# Patient Record
(demographics unavailable — no encounter records)

---

## 2025-01-29 NOTE — HISTORY OF PRESENT ILLNESS
[FreeTextEntry1] : Patient is 67 years old para 2-0-0-2 last menstrual period age 48 She presents with her  complaining of painless frederick hematuria Patient admits to being a current smoker of cigarettes Urine analysis reveals frederick blood.  Urine dipstick is indeterminant for leukocytes etc.

## 2025-01-29 NOTE — DISCUSSION/SUMMARY
[FreeTextEntry1] : Advised patient to follow-up with urologist for full urologic evaluation Advised patient to proceed to emergency department

## 2025-01-29 NOTE — PHYSICAL EXAM
[Chaperone Present] : A chaperone was present in the examining room during all aspects of the physical examination [FreeTextEntry2] : JOSE ARMANDO [Labia Majora] : normal [Labia Minora] : normal [Normal] : normal [Uterine Adnexae] : normal

## 2025-02-13 NOTE — REVIEW OF SYSTEMS
[Leg Claudication] : intermittent leg claudication [Dysuria] : dysuria [Joint Pain] : joint pain [Muscle Cramps] : muscle cramps [Negative] : Heme/Lymph [SOB] : no shortness of breath [Chest Discomfort] : no chest discomfort [Lower Ext Edema] : no extremity edema [Palpitations] : no palpitations

## 2025-02-13 NOTE — ASSESSMENT
[FreeTextEntry1] : Preprocedural cardiac risk assessment: Patient without ACS, acute HF or unstable arrhythmia. Able to perform >4 METS.  -Patient is intermediate risk for intermediate risk procedure.  -Patient can hold Xarelto for 3 days prior to procedure and Plavix 5 days prior.  -Pt will need to be on ASA 81mg PO during this period.  -Restart AC and Plavix post procedure.  -Pt unsure if she will go ahead with gastric procedure.   Palpitations/AF/SVT: Has had in the past. Still occurs about once a month. On BB. Pt with AF on monitor. S/P Ablation on 05/05/23. -Continue Eliquis 5mg PO BID. -Continue with Toprol 200mg PO daily. - Follow up with  -May need to repeat telemetry.   DM: HA1c 8.0%->6.2% (12/23)->7.3% (07/24) -Continue with metformin. -Endocrine follow up.   HTN: BP at goal per ACC/AHA 2018 guidelines -Continue with amlodipine-valsartan 10-160mg PO daily and HCTZ 12.5mg PO daily. -Check TTE.   HLD: , , HDL 38, ->, TG 97, HDL 43, LDL 53 (12/23) -Continue with atorvastatin 40mg PO daily and fenofibrate 48mg PO daily. -Discussed therapeutic lifestyle changes to promote improved lipid metabolism.  Varicose veins: Bothers her at times. -Negative for DVT, varicose veins.  Dysuria: -Referral to Urology (Dr Zabala).  PAD: Left SFA occlusion s/p  intervention -Continue with Plavix and Eliquis.  -Continue with atorvastatin.  - Follow up with vascular -Pain in toes likely due to neuropathy.  -Check US Arterial LE.   Follow up in 4 months. -Check labs.

## 2025-02-13 NOTE — HISTORY OF PRESENT ILLNESS
[FreeTextEntry1] : Ms. Parish is a 65yo F with PMHx of HTN, HLD, DM, palpitations who presents for follow up. Her PMD is Dr. Andre Graf. Patient has been complaining of palpitations. They happen about once a month. She feels lightheaded and dizzy, and near syncope, but denies LOC. She has some chest pain and SOB along with symptoms of palpitations. Has had stress testing in past which is normal.  -Patient had MCOT placed and episode last night. MCOT shows AF/Flutter vs SVT with HR 170s. Patient was symptomatic at the time. She felt palpitations, lightheadedness and dizziness for about 2 hours. She took Xanax which helped somewhat.  01/30/23-Patient still with intermittent palpitations. She feels better overall. No CP, SOB, lightheadedness/dizziness.  05/18/23-Pt underwent AF ablation on 05/05/23. No complications. She is in SB today. Patient quit smoking.  09/14/23-Patient is going for right TKR on 09/22/23 with Dr. Levy. She had forgotten her Eliquis in [facility]. She restarted after. She has left leg pain which she went to ED and ruled out for DVT.  01/11/24-Patient had US LE which showed significant B/L disease. CTA showed possible B/L SFA disease. Cath showing left SFA occlusion s/p intervention with Dr. Campos. Patient reports an improvement in her symptoms: less pain in left leg, left foot is not cold anymore. She complains of pain and redness in her left knee. Patient complains of occasional palpitations.  05/01/24-Patient is going for lipoma removal with Dr. Andre Ramsey 05/15. She is feeling well overall.  08/08/24-Patient will still get some fluttering in her chest. She also will get intermittent swelling in her ankles and feet. She also will get neuropathy.  02/13/25-Patient evaluated in ED for stroke-like symptoms due to shaking. She was diagnosed with UTI. She is still having some abdominal pain. She quit smoking. She may go for gastric bypass surgery. She stopped taking Jardiance due to UTIs.

## 2025-02-13 NOTE — PHYSICAL EXAM
[Well Developed] : well developed [Well Nourished] : well nourished [No Acute Distress] : no acute distress [Normal Conjunctiva] : normal conjunctiva [Normal Venous Pressure] : normal venous pressure [5th Left ICS - MCL] : palpated at the 5th LICS in the midclavicular line [Normal] : normal [No Precordial Heave] : no precordial heave was noted [Normal Rate] : normal [Rhythm Regular] : regular [Normal S1] : normal S1 [Normal S2] : normal S2 [No Murmur] : no murmurs heard [No Pitting Edema] : no pitting edema present [Rt] : varicose veins of the right leg noted [Lt] : varicose veins of the left leg noted [2+] : right 2+ [1+] : left 1+ [Clear Lung Fields] : clear lung fields [Good Air Entry] : good air entry [No Respiratory Distress] : no respiratory distress  [Soft] : abdomen soft [Non Tender] : non-tender [Normal Bowel Sounds] : normal bowel sounds [Normal Gait] : normal gait [No Edema] : no edema [No Varicosities] : no varicosities [No Rash] : no rash [Moves all extremities] : moves all extremities [No Focal Deficits] : no focal deficits [Alert and Oriented] : alert and oriented [de-identified] : Increased erythema to B/L feet. WWP.

## 2025-02-13 NOTE — REASON FOR VISIT
[Other: ____] : [unfilled] [FreeTextEntry1] : Diagnostic Tests: --------------------- EK25-Sinus bradycardia at 54 bpm. RBBB. LAFB. PRWP.  24-Sinus bradycardia at 53 bpm. RBBB. LAFB. PRWP.  24-NSR. RBBB. LAFB. PRWP.  24-NSR. RBBB. LAFB. PRWP 23-NSR with 1st degree AVB. RBBB. LAFB. PRWP 23-NSR. RBBB. LAFB. PRWP.  22-NSR. RBBB. RAD. PRWP.  22-NSR> RBBB. LAFB. PRWP.  10/30/20-NSR. RBBB. Lateral infarct. Inferior infarct.  19-NSR. RBBB. LAFB. Inferior infarct.  --------------------- Echo:  23-TTE: EF 63%. Aortic sclerosis. Mild MV calcification. Mild PI.  19-TTE: EF >55%. Normal echo.  -------------------- Stress: 22-Lexiscan MPI: Negative for ischemia. EF 55%.  19-Dobutamine TTE: Submaximal due to target HR not reached. No evidence of ischemia.  ------------------ US:  23-LE Arterial: Possible right inflow disease. Right pSFA >75%. Left dSFA >75%. Left DP occlusion.  01/10/23-LE Venous: Negative for DVT.  ----------------- Holter:  -22-MCOT 30 days: HR  bpm (70 bpm). 1% AF burden, longest 40 min. PAC/PVC 1%.  ------------------- CT:  23-CTA Aorta with runoff: Mod-severe right SFA stenosis to POP. Severe left distal SFA stenosis.  -------------------- Cath:  24-LE arterial: Right with occluded DESHAWN, PTA with mild throughout. Left % occlusion, DESHAWN occluded, mild throughout. S/P  intervention with [place] 5.0 x 80 and Jocelynn 6 x 60 x130.

## 2025-02-21 NOTE — ASSESSMENT
[FreeTextEntry1] : recent UTI with gross hematuria, no evidence of stone disease or any hydronephrosis, some thickness of bladder wall likely related to cystitis.  We discussed these issues at length. I have given her Pyridium to help with burning issues. We will send her urine for culture and sensitivity. I have asked her to follow-up with cystoscopy in 6-8 weeks to complete hematuria work-up, although this is likely due to UTI and use of anticoagulants. We discussed  UTI prevention: We discussed the pathophysiology of UTIs and how to prevent it. I suggested keeping hydrated, trying to not hold her urine for a long period of time, having regular and soft bowel movements, and wiping with a moist wipe from the front to the back after her bowel movements. I have asked her to try to keep bowels under better control and avoid diarrhea or constipation. I have also asked her to double void to prevent UTI because she is at high risk because of diabetes and bowel-related issues.  All of the patient's questions were otherwise answered. Total time=30 min.   The submitted E/M billing level for this visit reflects the total time spent on the day of the visit including face-to-face time spent with the patient, non-face-to-face review of medical records and relevant information, documentation, and asynchronous communication with the patient after a visit via phone, email, or patients EHR portal after the visit. The medical records reviewed are either scanned into the chart or reviewed with the patient using a patients electronic medical records portal for patients with records not available to [facility] via electronic transmission platforms from other institutions and labs. Time spent counseling and performing coordination of care was also included in determining the appropriate EM billing level.   I have reviewed and verified information regarding the chief complaint and history recorded by the ancillary staff and/or the patient. I have independently reviewed and interpreted tests performed by other physicians and facilities as necessary.   I have discussed with the patient differential diagnosis, reason for auxiliary tests if ordered, risks, benefits, alternatives, and complications of each form of therapy were discussed.  I personally performed the services described in the documentation, reviewed the documentation recorded by the scribe in my presence, and it accurately and completely records my words and actions.

## 2025-02-21 NOTE — HISTORY OF PRESENT ILLNESS
[FreeTextEntry1] :  67 year old female patient seen for evaluation of gross hematuria and burning who presented to emergency room. She had CT, which I reviewed in its entirety and it showed no stones or hydronephrosis. She was noted to have E.coli UTI and treated with Cefpodoxime. she was also given Nitrofurantoin, although it is unclear what that order was. Her main complaint now is some burning when urinating at times. 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 At the time, she had moderately elevated sugars and was spilling sugar in urine. She has chronic fecal incontinence, which may be playing some role. She also has left sided discomfort, which is not flank pain. It is described as lower and goes toward her back. I explained that it is probably related to her hip or back. She wanted to know if sonogram of kidneys would be helpful and I told her no, as her CT looked fine. We will send her urine for culture today.

## 2025-02-21 NOTE — PHYSICAL EXAM
[General Appearance - Well Developed] : well developed [General Appearance - In No Acute Distress] : no acute distress [Oriented To Time, Place, And Person] : oriented to person, place, and time [FreeTextEntry1] :  Patient was accompanied by her .

## 2025-02-21 NOTE — ADDENDUM
[FreeTextEntry1] :  SOCORRO MORRIS, am scribing for and in the presence of  in the following sections: HISTORY OF PRESENT ILLNESS, PAST MEDICAL/FAMILY/SOCIAL HISTORY, REVIEW OF SYSTEMS, VITAL SIGNS, PHYSICAL EXAM, ASSESSMENT/PLAN on 02/21/2025.

## 2025-02-27 NOTE — REASON FOR VISIT
[Follow-Up] : a follow-up evaluation of [Breast Complaint - Not Pregnant] : breast complaint - not pregnant [Urinary Complaints] : urinary complaints

## 2025-02-27 NOTE — HISTORY OF PRESENT ILLNESS
[FreeTextEntry1] : Patient is 67 years old para 2-0-0-2 She denies postmenopausal bleeding She is status post UTI with hematuria treated with nitrofurantoin x 5 days Urine analysis today reveals trace leukocytes.  Urine culture sent. Patient also complains of right breast nodule Patient complains of vaginal discharge with odor CT scan of abdomen and pelvis performed on January 29, 2025 revealed mild urothelial enhancement of the right and left renal pelvis consistent with urinary tract infection.  No evidence of hydronephrosis, calcified stones or solid mass.  Reproductive organs unremarkable.

## 2025-02-27 NOTE — DISCUSSION/SUMMARY
[FreeTextEntry1] : Prescribed right breast diagnostic mammogram and right breast ultrasound Prescribed pelvic ultrasound Urine culture sent Encouraged hydration

## 2025-02-27 NOTE — PHYSICAL EXAM
[Chaperone Present] : A chaperone was present in the examining room during all aspects of the physical examination [Appropriately responsive] : appropriately responsive [Alert] : alert [No Acute Distress] : no acute distress [No Lymphadenopathy] : no lymphadenopathy [Regular Rate Rhythm] : regular rate rhythm [No Murmurs] : no murmurs [Clear to Auscultation B/L] : clear to auscultation bilaterally [Soft] : soft [Non-tender] : non-tender [Non-distended] : non-distended [No HSM] : No HSM [No Lesions] : no lesions [No Mass] : no mass [Oriented x3] : oriented x3 [Examination Of The Breasts] : a normal appearance [Normal] : normal [No Masses] : no breast masses were palpable [FreeTextEntry2] : Aury

## 2025-04-23 NOTE — ASSESSMENT
[FreeTextEntry1] : Uterovaginal prolapse - Unchanged stage 3 asymptomatic prolapse. Discussed this with patient and her . Her PVR was WNL today. Provided reassurance. Instructed to return in 6 months for reevaluation of sooner if starts to feel prolapse sxs. Will review UDS report once it is available.  Mckayla - Follows with Dr. Zabala. Currently takes methenamine. Will start vaginal estrogen therapy as well.  Atrophic vaginitis - Discussed etiology and treatment options with patient. Discussed R/B/A of estrogen vaginal cream. Patient will start using as follows: Place a pea size (0.5 grams or less) dab of Estrogen vaginal cream using finger (NOT the applicator) into the vagina 3 times a week ( Monday, Wednesday, Friday)

## 2025-04-23 NOTE — REASON FOR VISIT
[TextEntry] : Reason for visit: Follow Up Voids per day: 8-10   Voids per night: 10   Urge incontinence: Yes Stress incontinence: Yes Constipation: No  Fecal incontinence: Occasionally Vaginal bulge: Yes

## 2025-04-23 NOTE — HISTORY OF PRESENT ILLNESS
[FreeTextEntry1] : 67 year old with asymptomatic stage 3 prolapse, JUANA, urinary urgency, nocturia and Mckayla. Currently on methenamine suppression - follows with Urology. Had UDS with urology on .  She continues to not be bothered by her prolapse. Denies sxs of incomplete bladder emptying.   Voids 8-10/ day, 10 /night. + urgency, 7 PAYAL/week, 7 UUI/week, 1 UTI's in past 6 months, no bladder diet. No vaginal estrogen. No vaginal bleeding.   From initial visit: 66 year para 2 ( x2) presents with complaints of vaginal itching and odor.  Pelvic organ prolapse: no bulge, no pressure/heaviness Stress urinary incontinence: 7 x/week no prior incontinence procedures Overactive bladder syndrome: daily frequency 8-10 x/day, 10 x/night, + urgency, 7+ x/week UUI episodes, 4-5 pads/day Bladder irritants include coffee, diet soda, lemon water, Prior OAB meds no Voiding dysfunction: no Incomplete bladder emptying, occasional hesitancy Lower urinary tract/vaginal symptoms: 4 UTIs per year, no hematuria, no dysuria, no bladder pain + itching + burning 7 BM/week no constipation Fecal incontinence occasional (with diarrhea) Sexually active + Dyspareunia no Pelvic pain no Vaginal dryness + LMP age 50 PMB no  PMSH: DM 2 - HbA1c 7.3% (2024) current every day smoker x50 years (trying to quit) BMI 36 (lost 65 pounds)

## 2025-04-23 NOTE — PHYSICAL EXAM
[FreeTextEntry2] : Melissa [FreeTextEntry1] : Void: 100cc  PVR: 30cc  Urethra was prepped in sterile fashion and then a sterile 14F catheter was used by me to drain the bladder for her symptoms of urgency. Patient tolerated the procedure well  GH: 5 PB: 3 TVL: 8 C: +1 D: 0 Aa: +2 Ba: +2 Ap: -1 Bp: -1 (unchanged)  + severe vaginal atrophy

## 2025-04-23 NOTE — COUNSELING
[FreeTextEntry1] : Please return to see Dr. Morales in about 6 months.  Place a pea size (0.5 grams or less) dab of Estrogen vaginal cream using finger (NOT the applicator) into the vagina 3 times a week ( Monday, Wednesday, Friday)

## 2025-04-27 NOTE — HISTORY OF PRESENT ILLNESS
[FreeTextEntry1] :  65 F active smoker with PAD s/p Angiogram with BECKIE to left SFA 1/2024, HTN, HLD, T2DM and AFib presents for follow up     ========= Data Reviewed today ========== Vital signs  3/24 Le arterial duplex: patent left SFA stent. b/l infropop disease    US duplex (preprocedure): Left SFA occlusion LE angiogram s/p. BECKIE

## 2025-04-27 NOTE — ASSESSMENT
[FreeTextEntry1] : #PAD s/p Left SFA BECKIE  # Leyla IV # Peripheral neuropathy - s/p LE angiogram Jan 2024 - s/p BECKIE to left SFA  left foot pain, bounding distal pulses    Plan:  - Stop Norvasc  - Losartan 50 daily  - C/w Plavix  - Exercise therapy  - Follow up in  6 months

## 2025-04-27 NOTE — PHYSICAL EXAM
[Normal Rate] : normal [Rhythm Regular] : regular [Normal S1] : normal S1 [Normal S2] : normal S2 [2+] : left 2+ [1+] : left 1+ [General Appearance - Well Developed] : well developed [Normal Oral Mucosa] : normal oral mucosa [Heart Rate And Rhythm] : heart rate and rhythm were normal [Heart Sounds] : normal S1 and S2 [Nail Clubbing] : no clubbing of the fingernails [Oriented To Time, Place, And Person] : oriented to person, place, and time [] : no rash

## 2025-05-19 NOTE — HISTORY OF PRESENT ILLNESS
[de-identified] : 67-year-old female patient is present today for an initial evaluation for nosebleeds. Patient states that she went to the ER 05/15/25 due to her falling down. Fell 2 week fell and was bleeding from left nostril for 10 minutes. She is not bleeding right now because she was cauterized but she just wanted to come get it checked out. PT states that she is not having any pain right now. Currently taking Eliquis and Plavix. Takes blood pressure medication.

## 2025-05-19 NOTE — ASSESSMENT
[FreeTextEntry1] : Cauterized today CT maxillofacial personally reviewed and interpreted demonstrating no evidence of nasal bone or septal fracture Discussed nasal emollients

## 2025-05-19 NOTE — PROCEDURE
[Epistaxis] : evaluation of epistaxis [None] : none [Rigid Endoscope] : examined with a rigid endoscope [FreeTextEntry6] : Bilateral nasal endoscopy performed. No mucosal masses or lesions. Bilateral ostiomeatal complexes are clear without discharge.  Left anterior caudal septum with exposed vessel and bleeding, controlled with endoscopically directed silver nitrate cautery

## 2025-05-22 NOTE — PHYSICAL EXAM
[Well Developed] : well developed [Well Nourished] : well nourished [No Acute Distress] : no acute distress [Normal Conjunctiva] : normal conjunctiva [Normal Venous Pressure] : normal venous pressure [5th Left ICS - MCL] : palpated at the 5th LICS in the midclavicular line [Normal] : normal [No Precordial Heave] : no precordial heave was noted [Normal Rate] : normal [Rhythm Regular] : regular [Normal S1] : normal S1 [Normal S2] : normal S2 [No Murmur] : no murmurs heard [No Pitting Edema] : no pitting edema present [Rt] : varicose veins of the right leg noted [Lt] : varicose veins of the left leg noted [2+] : right 2+ [1+] : left 1+ [Clear Lung Fields] : clear lung fields [Good Air Entry] : good air entry [No Respiratory Distress] : no respiratory distress  [Soft] : abdomen soft [Non Tender] : non-tender [Normal Bowel Sounds] : normal bowel sounds [Normal Gait] : normal gait [No Edema] : no edema [No Varicosities] : no varicosities [No Rash] : no rash [Moves all extremities] : moves all extremities [No Focal Deficits] : no focal deficits [Alert and Oriented] : alert and oriented [de-identified] : Increased erythema to B/L feet. WWP.

## 2025-05-22 NOTE — PHYSICAL EXAM
[Well Developed] : well developed [Well Nourished] : well nourished [No Acute Distress] : no acute distress [Normal Conjunctiva] : normal conjunctiva [Normal Venous Pressure] : normal venous pressure [5th Left ICS - MCL] : palpated at the 5th LICS in the midclavicular line [Normal] : normal [No Precordial Heave] : no precordial heave was noted [Normal Rate] : normal [Rhythm Regular] : regular [Normal S1] : normal S1 [Normal S2] : normal S2 [No Murmur] : no murmurs heard [No Pitting Edema] : no pitting edema present [Rt] : varicose veins of the right leg noted [Lt] : varicose veins of the left leg noted [2+] : right 2+ [1+] : left 1+ [Clear Lung Fields] : clear lung fields [Good Air Entry] : good air entry [No Respiratory Distress] : no respiratory distress  [Soft] : abdomen soft [Non Tender] : non-tender [Normal Bowel Sounds] : normal bowel sounds [Normal Gait] : normal gait [No Edema] : no edema [No Varicosities] : no varicosities [No Rash] : no rash [Moves all extremities] : moves all extremities [No Focal Deficits] : no focal deficits [Alert and Oriented] : alert and oriented [de-identified] : Increased erythema to B/L feet. WWP.

## 2025-05-22 NOTE — REASON FOR VISIT
[Other: ____] : [unfilled] [FreeTextEntry1] : Diagnostic Tests: --------------------- EK25-Sinus bradycardia with 1st degree AVB at 57 bpm. RBBB. LAFB. PRWP.  25-Sinus bradycardia at 54 bpm. RBBB. LAFB. PRWP.  24-Sinus bradycardia at 53 bpm. RBBB. LAFB. PRWP.  24-NSR. RBBB. LAFB. PRWP.  24-NSR. RBBB. LAFB. PRWP 23-NSR with 1st degree AVB. RBBB. LAFB. PRWP 23-NSR. RBBB. LAFB. PRWP.  22-NSR. RBBB. RAD. PRWP.  22-NSR> RBBB. LAFB. PRWP.  10/30/20-NSR. RBBB. Lateral infarct. Inferior infarct.  19-NSR. RBBB. LAFB. Inferior infarct.  --------------------- Echo:  23-TTE: EF 63%. Aortic sclerosis. Mild MV calcification. Mild PI.  19-TTE: EF >55%. Normal echo.  -------------------- Stress: 22-Lexiscan MPI: Negative for ischemia. EF 55%.  19-Dobutamine TTE: Submaximal due to target HR not reached. No evidence of ischemia.  ------------------ US:  24-LE Arterial: L dSFA stent patent. B/L infrapop vessels patent. dATA with significant stenosis.  23-LE Arterial: Possible right inflow disease. Right pSFA >75%. Left dSFA >75%. Left DP occlusion.  01/10/23-LE Venous: Negative for DVT.  ----------------- Holter:  -22-MCOT 30 days: HR  bpm (70 bpm). 1% AF burden, longest 40 min. PAC/PVC 1%.  ------------------- CT:  23-CTA Aorta with runoff: Mod-severe right SFA stenosis to POP. Severe left distal SFA stenosis.  -------------------- Cath:  24-LE arterial: Right with occluded DESHAWN, PTA with mild throughout. Left % occlusion, DESHAWN occluded, mild throughout. S/P  intervention with [place] 5.0 x 80 and Jocelynn 6 x 60 x130.

## 2025-05-22 NOTE — HISTORY OF PRESENT ILLNESS
[FreeTextEntry1] : Ms. Parish is a 66yo F with PMHx of HTN, HLD, DM, palpitations who presents for follow up. Her PMD is Dr. Andre Graf. Patient has been complaining of palpitations. They happen about once a month. She feels lightheaded and dizzy, and near syncope, but denies LOC. She has some chest pain and SOB along with symptoms of palpitations. Has had stress testing in past which is normal.  -Patient had MCOT placed and episode last night. MCOT shows AF/Flutter vs SVT with HR 170s. Patient was symptomatic at the time. She felt palpitations, lightheadedness and dizziness for about 2 hours. She took Xanax which helped somewhat.  01/30/23-Patient still with intermittent palpitations. She feels better overall. No CP, SOB, lightheadedness/dizziness.  05/18/23-Pt underwent AF ablation on 05/05/23. No complications. She is in SB today. Patient quit smoking.  09/14/23-Patient is going for right TKR on 09/22/23 with Dr. Levy. She had forgotten her Eliquis in [facility]. She restarted after. She has left leg pain which she went to ED and ruled out for DVT.  01/11/24-Patient had US LE which showed significant B/L disease. CTA showed possible B/L SFA disease. Cath showing left SFA occlusion s/p intervention with Dr. Campos. Patient reports an improvement in her symptoms: less pain in left leg, left foot is not cold anymore. She complains of pain and redness in her left knee. Patient complains of occasional palpitations.  05/01/24-Patient is going for lipoma removal with Dr. Andre Ramsey 05/15. She is feeling well overall.  08/08/24-Patient will still get some fluttering in her chest. She also will get intermittent swelling in her ankles and feet. She also will get neuropathy.  02/13/25-Patient evaluated in ED for stroke-like symptoms due to shaking. She was diagnosed with UTI. She is still having some abdominal pain. She quit smoking. She may go for gastric bypass surgery. She stopped taking Jardiance due to UTIs.  05/22/25-She will get palpitations at times. But more rarely than previous.

## 2025-05-22 NOTE — REASON FOR VISIT
[Other: ____] : [unfilled] [FreeTextEntry1] : Diagnostic Tests: --------------------- EK25-Sinus bradycardia with 1st degree AVB at 57 bpm. RBBB. LAFB. PRWP.  25-Sinus bradycardia at 54 bpm. RBBB. LAFB. PRWP.  24-Sinus bradycardia at 53 bpm. RBBB. LAFB. PRWP.  24-NSR. RBBB. LAFB. PRWP.  24-NSR. RBBB. LAFB. PRWP 23-NSR with 1st degree AVB. RBBB. LAFB. PRWP 23-NSR. RBBB. LAFB. PRWP.  22-NSR. RBBB. RAD. PRWP.  22-NSR> RBBB. LAFB. PRWP.  10/30/20-NSR. RBBB. Lateral infarct. Inferior infarct.  19-NSR. RBBB. LAFB. Inferior infarct.  --------------------- Echo:  23-TTE: EF 63%. Aortic sclerosis. Mild MV calcification. Mild PI.  19-TTE: EF >55%. Normal echo.  -------------------- Stress: 22-Lexiscan MPI: Negative for ischemia. EF 55%.  19-Dobutamine TTE: Submaximal due to target HR not reached. No evidence of ischemia.  ------------------ US:  24-LE Arterial: L dSFA stent patent. B/L infrapop vessels patent. dATA with significant stenosis.  23-LE Arterial: Possible right inflow disease. Right pSFA >75%. Left dSFA >75%. Left DP occlusion.  01/10/23-LE Venous: Negative for DVT.  ----------------- Holter:  -22-MCOT 30 days: HR  bpm (70 bpm). 1% AF burden, longest 40 min. PAC/PVC 1%.  ------------------- CT:  23-CTA Aorta with runoff: Mod-severe right SFA stenosis to POP. Severe left distal SFA stenosis.  -------------------- Cath:  24-LE arterial: Right with occluded DESHAWN, PTA with mild throughout. Left % occlusion, DESHAWN occluded, mild throughout. S/P  intervention with [place] 5.0 x 80 and Jocelynn 6 x 60 x130.

## 2025-05-22 NOTE — ASSESSMENT
[FreeTextEntry1] : Palpitations/AF/SVT: Has had in the past. Still occurs about once a month. On BB. Pt with AF on monitor. S/P Ablation on 05/05/23. -Continue Eliquis 5mg PO BID. -Continue with Toprol 200mg PO daily. - Follow up with  -May need to repeat telemetry.   DM: HA1c 8.0%->6.2% (12/23)->7.3% (07/24)->7.3% (05/25) -Continue with metformin. -Endocrine follow up.   HTN: BP at goal per ACC/AHA 2018 guidelines -Continue with amlodipine-valsartan 10-160mg PO daily and HCTZ 12.5mg PO daily. -Check TTE.   HLD: , , HDL 38, ->, TG 97, HDL 43, LDL 53 (12/23)->, , HDL 49, LDL 58 (05/25) -Continue with atorvastatin 40mg PO daily and fenofibrate 48mg PO daily. -Discussed therapeutic lifestyle changes to promote improved lipid metabolism.  Varicose veins: Bothers her at times. -Negative for DVT, varicose veins.  Dysuria: -Referral to Urology (Dr Zabala).  PAD: Left SFA occlusion s/p  intervention -Continue with Plavix and Eliquis.  -Continue with atorvastatin.  - Follow up with vascular -Pain in toes likely due to neuropathy.  -Check US Arterial LE.   Follow up in 4 months. -Check labs.

## 2025-05-22 NOTE — HISTORY OF PRESENT ILLNESS
[FreeTextEntry1] : Ms. Parish is a 68yo F with PMHx of HTN, HLD, DM, palpitations who presents for follow up. Her PMD is Dr. Andre Graf. Patient has been complaining of palpitations. They happen about once a month. She feels lightheaded and dizzy, and near syncope, but denies LOC. She has some chest pain and SOB along with symptoms of palpitations. Has had stress testing in past which is normal.  -Patient had MCOT placed and episode last night. MCOT shows AF/Flutter vs SVT with HR 170s. Patient was symptomatic at the time. She felt palpitations, lightheadedness and dizziness for about 2 hours. She took Xanax which helped somewhat.  01/30/23-Patient still with intermittent palpitations. She feels better overall. No CP, SOB, lightheadedness/dizziness.  05/18/23-Pt underwent AF ablation on 05/05/23. No complications. She is in SB today. Patient quit smoking.  09/14/23-Patient is going for right TKR on 09/22/23 with Dr. Levy. She had forgotten her Eliquis in [facility]. She restarted after. She has left leg pain which she went to ED and ruled out for DVT.  01/11/24-Patient had US LE which showed significant B/L disease. CTA showed possible B/L SFA disease. Cath showing left SFA occlusion s/p intervention with Dr. Campos. Patient reports an improvement in her symptoms: less pain in left leg, left foot is not cold anymore. She complains of pain and redness in her left knee. Patient complains of occasional palpitations.  05/01/24-Patient is going for lipoma removal with Dr. Andre Ramsey 05/15. She is feeling well overall.  08/08/24-Patient will still get some fluttering in her chest. She also will get intermittent swelling in her ankles and feet. She also will get neuropathy.  02/13/25-Patient evaluated in ED for stroke-like symptoms due to shaking. She was diagnosed with UTI. She is still having some abdominal pain. She quit smoking. She may go for gastric bypass surgery. She stopped taking Jardiance due to UTIs.  05/22/25-She will get palpitations at times. But more rarely than previous.